# Patient Record
Sex: FEMALE | Race: OTHER | HISPANIC OR LATINO | Employment: UNEMPLOYED | URBAN - METROPOLITAN AREA
[De-identification: names, ages, dates, MRNs, and addresses within clinical notes are randomized per-mention and may not be internally consistent; named-entity substitution may affect disease eponyms.]

---

## 2023-07-10 ENCOUNTER — HOSPITAL ENCOUNTER (EMERGENCY)
Facility: HOSPITAL | Age: 23
Discharge: HOME | End: 2023-07-10
Attending: EMERGENCY MEDICINE

## 2023-07-10 VITALS
BODY MASS INDEX: 36.09 KG/M2 | RESPIRATION RATE: 16 BRPM | DIASTOLIC BLOOD PRESSURE: 72 MMHG | TEMPERATURE: 97.8 F | SYSTOLIC BLOOD PRESSURE: 133 MMHG | HEART RATE: 72 BPM | OXYGEN SATURATION: 95 % | WEIGHT: 223.6 LBS

## 2023-07-10 DIAGNOSIS — N39.0 UTI (URINARY TRACT INFECTION): Primary | ICD-10-CM

## 2023-07-10 LAB
AMORPH URATE CRY URNS QL MICRO: ABNORMAL /HPF
BACTERIA UR QL AUTO: ABNORMAL /HPF
BILIRUB UR QL STRIP: ABNORMAL
CLARITY UR: ABNORMAL
COLOR UR: ABNORMAL
EXT PREGNANCY TEST URINE: NEGATIVE
EXT. CONTROL: NORMAL
GLUCOSE UR STRIP-MCNC: NEGATIVE MG/DL
HGB UR QL STRIP.AUTO: 50
KETONES UR STRIP-MCNC: NEGATIVE MG/DL
LEUKOCYTE ESTERASE UR QL STRIP: NEGATIVE
NITRITE UR QL STRIP: POSITIVE
NON-SQ EPI CELLS URNS QL MICRO: ABNORMAL /HPF
PH UR STRIP.AUTO: 5 [PH]
PROT UR STRIP-MCNC: ABNORMAL MG/DL
RBC #/AREA URNS AUTO: ABNORMAL /HPF
SP GR UR STRIP.AUTO: 1.02 (ref 1–1.04)
URINE COMMENT: ABNORMAL
UROBILINOGEN UA: 8 MG/DL
WBC #/AREA URNS AUTO: ABNORMAL /HPF

## 2023-07-10 PROCEDURE — 81025 URINE PREGNANCY TEST: CPT | Performed by: EMERGENCY MEDICINE

## 2023-07-10 PROCEDURE — 81001 URINALYSIS AUTO W/SCOPE: CPT | Performed by: EMERGENCY MEDICINE

## 2023-07-10 PROCEDURE — 81003 URINALYSIS AUTO W/O SCOPE: CPT | Performed by: EMERGENCY MEDICINE

## 2023-07-10 RX ORDER — NITROFURANTOIN 25; 75 MG/1; MG/1
100 CAPSULE ORAL 2 TIMES DAILY
Qty: 10 CAPSULE | Refills: 0 | Status: SHIPPED | OUTPATIENT
Start: 2023-07-10 | End: 2023-07-15

## 2023-07-10 NOTE — ED PROVIDER NOTES
History  Chief Complaint   Patient presents with   • Possible UTI     Pt c/o burning with urination. 76-year-old female, presents with burning with urination. Symptoms present for 2 days, had similar symptoms several months ago with urinary tract infection. Denies any associated fevers or back pain. History provided by:  Patient   used: Yes        Prior to Admission Medications   Prescriptions Last Dose Informant Patient Reported? Taking?   doxycycline hyclate (VIBRAMYCIN) 100 mg capsule  Self Yes No   Sig: Take 100 mg by mouth every 12 (twelve) hours   triamcinolone (KENALOG) 0.1 % cream   No No   Sig: Apply topically 2 (two) times a day      Facility-Administered Medications: None       History reviewed. No pertinent past medical history. Past Surgical History:   Procedure Laterality Date   • KNEE LIGAMENT RECONSTRUCTION Right 09/21/2020    Knee cap dislocation ([place])       Family History   Problem Relation Age of Onset   • No Known Problems Mother    • No Known Problems Father    • Breast cancer Sister 35        half sister-same dad   • No Known Problems Sister    • No Known Problems Brother    • No Known Problems Brother    • No Known Problems Brother    • No Known Problems Brother    • No Known Problems Maternal Grandmother    • No Known Problems Maternal Grandfather    • No Known Problems Paternal Grandmother    • Diabetes Paternal Grandfather      I have reviewed and agree with the history as documented. E-Cigarette/Vaping   • E-Cigarette Use Never User      E-Cigarette/Vaping Substances   • Nicotine No    • THC No    • CBD No    • Flavoring No      Social History     Tobacco Use   • Smoking status: Never   • Smokeless tobacco: Never   Vaping Use   • Vaping Use: Never used   Substance Use Topics   • Alcohol use: Never   • Drug use: Never       Review of Systems   Constitutional: Negative. Negative for fever. Genitourinary: Positive for dysuria.    Musculoskeletal: Negative. Negative for back pain. Physical Exam  Physical Exam  Vitals and nursing note reviewed. Constitutional:       General: She is not in acute distress. HENT:      Head: Normocephalic and atraumatic. Cardiovascular:      Rate and Rhythm: Normal rate. Pulmonary:      Effort: Pulmonary effort is normal.   Abdominal:      General: There is no distension. Palpations: Abdomen is soft. Tenderness: There is no abdominal tenderness. There is no right CVA tenderness or left CVA tenderness. Skin:     General: Skin is warm and dry. Neurological:      General: No focal deficit present. Mental Status: She is alert and oriented to person, place, and time. Motor: No weakness. Gait: Gait normal.         Vital Signs  ED Triage Vitals [07/10/23 0951]   Temperature Pulse Respirations Blood Pressure SpO2   97.8 °F (36.6 °C) 72 16 133/72 95 %      Temp Source Heart Rate Source Patient Position - Orthostatic VS BP Location FiO2 (%)   Tympanic Monitor Lying Left arm --      Pain Score       5           Vitals:    07/10/23 0951   BP: 133/72   Pulse: 72   Patient Position - Orthostatic VS: Lying         Visual Acuity      ED Medications  Medications - No data to display    Diagnostic Studies  Results Reviewed     Procedure Component Value Units Date/Time    Urine Microscopic [[id number]]  (Abnormal) Collected: 07/10/23 0957    Lab Status: Final result Specimen: Urine, Clean Catch Updated: 07/10/23 1039     RBC, UA 4-10 /hpf      WBC, UA 4-10 /hpf      Epithelial Cells Occasional /hpf      Bacteria, UA       Field obscured, unable to enumerate     /hpf     AMORPH URATES Innumerable /hpf      URINE COMMENT Urine is very icteric, possibly due to dye.     UA w Reflex to Microscopic w Reflex to Culture [[id number]]  (Abnormal) Collected: 07/10/23 0957    Lab Status: Final result Specimen: Urine, Clean Catch Updated: 07/10/23 1038     Color, UA Red     Clarity, UA Turbid     Specific Gravity, UA 1.025 pH, UA 5.0     Leukocytes, UA Negative     Nitrite, UA Positive     Protein,  (2+) mg/dl      Glucose, UA Negative mg/dl      Ketones, UA Negative mg/dl      Bilirubin, UA 6 mg/dL     Occult Blood, UA 50.0     UROBILINOGEN UA 8.0 mg/dL     POCT pregnancy, urine [[id number]]  (Normal) Resulted: 07/10/23 0958    Lab Status: Final result Updated: 07/10/23 0959     EXT Preg Test, Ur Negative     Control Valid                 No orders to display              Procedures  Procedures         ED Course                               SBIRT 22yo+    Flowsheet Row Most Recent Value   Initial Alcohol Screen: US AUDIT-C     1. How often do you have a drink containing alcohol? 0 Filed at: 07/10/2023 0958   2. How many drinks containing alcohol do you have on a typical day you are drinking? 0 Filed at: 07/10/2023 0958   3a. Male UNDER 65: How often do you have five or more drinks on one occasion? 0 Filed at: 07/10/2023 0958   3b. FEMALE Any Age, or MALE 65+: How often do you have 4 or more drinks on one occassion? 0 Filed at: 07/10/2023 0958   Audit-C Score 0 Filed at: 07/10/2023 3430   MARCO ANTONIO: How many times in the past year have you. .. Used an illegal drug or used a prescription medication for non-medical reasons? Never Filed at: 07/10/2023 1364                    Medical Decision Making  23-year-old female, presents with dysuria. Differential diagnosis include urinary tract infection, pyelonephritis among other diagnoses. Patient looks well and in no distress, afebrile, benign abdominal exam.  Urinalysis ordered, results reviewed and discussed with patient, consistent with urinary tract infection. Will treat with oral antibiotics, Macrobid prescribed to pharmacy. Patient reports frequent urinary tract infections, discussed with her follow-up with gynecology for further evaluation. Instructed to follow-up or return emergency department for any worsening or new concerning symptoms.     I have reviewed test results and diagnosis with patient. Follow-up plan reviewed. Precautions for acute return for re-evaluation are reviewed. Opportunity to ask questions was provided. Patient verbalizes understanding. Amount and/or Complexity of Data Reviewed  External Data Reviewed: notes. Details: Seen 11/15 at another emergency department, was given Keflex for urinary tract infection. Labs: ordered. Decision-making details documented in ED Course. Risk  Prescription drug management. Disposition  Final diagnoses:   UTI (urinary tract infection)     Time reflects when diagnosis was documented in both MDM as applicable and the Disposition within this note     Time User Action Codes Description Comment    7/10/2023 10:48 AM Ryley Ortiz Add [N39.0] UTI (urinary tract infection)       ED Disposition     ED Disposition   Discharge    Condition   Stable    Date/Time   Mon Jul 10, 2023 10:48 AM    Comment   Oscar Booker discharge to home/self care.                Follow-up Information     Follow up With Specialties Details Why Contact Info Additional [address] [facility] Schedule an appointment as soon as possible for a visit   [address] [address]  [address] [place] [address], [address] [place] [address], [address] [phone]          Discharge Medication List as of 7/10/2023 10:49 AM      START taking these medications    Details   nitrofurantoin (MACROBID) 100 mg capsule Take 1 capsule (100 mg total) by mouth 2 (two) times a day for 5 days, Starting Mon 7/10/2023, Until Sat 7/15/2023, Normal         CONTINUE these medications which have NOT CHANGED    Details   doxycycline hyclate (VIBRAMYCIN) 100 mg capsule Take 100 mg by mouth every 12 (twelve) hours, Historical Med      triamcinolone (KENALOG) 0.1 % cream Apply topically 2 (two) times a day, Starting Mon 5/8/2023, Normal             No discharge procedures on file.     PDMP Review     None          ED Provider  Electronically Signed by           Maxi Gonzalez MD  07/10/23 4920

## 2024-01-12 ENCOUNTER — TELEPHONE (OUTPATIENT)
Dept: PSYCHIATRY | Facility: CLINIC | Age: 24
End: 2024-01-12

## 2024-01-12 NOTE — TELEPHONE ENCOUNTER
From: [email or web address] <[email or web address]>   Sent: Tuesday, January 2, 2024 7:33 AM  To: [employer] <[email or web address]  Subject: [EXTERNAL] [facility] - Contact Us    WARNING! Based upon the critical issue with [employer] targeting Healthcare systems ALL attachments and links from this email should be heavily scrutinized.  First Name: Nova  Last Name: Lisa  Email: [email or web address]  Phone: [phone]   Leave Message: Yes, it's ok to leave a message.   Do you have a family doctor? No   Zip: [id number]   What is your reason for contacting us? Me katherine obtmarina ayuda psicológica, en español por favor

## 2024-01-12 NOTE — TELEPHONE ENCOUNTER
Called pt to follow up on message sent, verify needs of services and inform pt of current wait list for services. No answer, lvm for pt to call back the intake dept at [phone].

## 2024-03-11 ENCOUNTER — TELEPHONE (OUTPATIENT)
Dept: OBGYN CLINIC | Facility: CLINIC | Age: 24
End: 2024-03-11

## 2024-03-11 NOTE — TELEPHONE ENCOUNTER
Lvm for patient to advise she would need to be seen in office before medication is sent. Last seen 5/2023, given office call back number to contact.

## 2024-03-18 ENCOUNTER — TELEPHONE (OUTPATIENT)
Age: 24
End: 2024-03-18

## 2024-03-18 ENCOUNTER — TELEPHONE (OUTPATIENT)
Dept: BARIATRICS | Facility: CLINIC | Age: 24
End: 2024-03-18

## 2024-03-18 NOTE — TELEPHONE ENCOUNTER
Patient called in to request surgical consult with Dr Lynn, I told her her BMI was too low but she said she now weights 253 lbs, which would put her right at a 40 BMI.  Tried to tx to clinic but no one was available, can someone please call her to schedule, she is Guamanian speaking so she will need an .  Her call back number is [phone].  Thanks.

## 2024-03-18 NOTE — TELEPHONE ENCOUNTER
XOCHITL with the help of Urdu interpretor [phone] to schedule an sx evaluation. Left office info to call back [phone]

## 2024-03-20 ENCOUNTER — OFFICE VISIT (OUTPATIENT)
Dept: OBGYN CLINIC | Facility: CLINIC | Age: 24
End: 2024-03-20

## 2024-03-20 VITALS
DIASTOLIC BLOOD PRESSURE: 74 MMHG | BODY MASS INDEX: 39.76 KG/M2 | SYSTOLIC BLOOD PRESSURE: 119 MMHG | HEART RATE: 59 BPM | HEIGHT: 66 IN | WEIGHT: 247.4 LBS

## 2024-03-20 DIAGNOSIS — B96.89 BACTERIAL VAGINOSIS: ICD-10-CM

## 2024-03-20 DIAGNOSIS — B37.9 YEAST INFECTION: Primary | ICD-10-CM

## 2024-03-20 DIAGNOSIS — N76.0 BACTERIAL VAGINOSIS: ICD-10-CM

## 2024-03-20 LAB
BV WHIFF TEST VAG QL: POSITIVE
CLUE CELLS SPEC QL WET PREP: POSITIVE
PH SMN: 5.5 [PH]
SL AMB POCT WET MOUNT: ABNORMAL
T VAGINALIS VAG QL WET PREP: NEGATIVE
YEAST VAG QL WET PREP: POSITIVE

## 2024-03-20 PROCEDURE — 99213 OFFICE O/P EST LOW 20 MIN: CPT | Performed by: NURSE PRACTITIONER

## 2024-03-20 PROCEDURE — 87210 SMEAR WET MOUNT SALINE/INK: CPT | Performed by: NURSE PRACTITIONER

## 2024-03-20 RX ORDER — FLUCONAZOLE 150 MG/1
150 TABLET ORAL ONCE
Qty: 1 TABLET | Refills: 1 | Status: SHIPPED | OUTPATIENT
Start: 2024-03-20 | End: 2024-03-20

## 2024-03-20 RX ORDER — METRONIDAZOLE 500 MG/1
500 TABLET ORAL EVERY 12 HOURS SCHEDULED
Qty: 14 TABLET | Refills: 0 | Status: SHIPPED | OUTPATIENT
Start: 2024-03-20 | End: 2024-03-27

## 2024-03-20 NOTE — PROGRESS NOTES
"Assessment/Plan:     Diagnoses and all orders for this visit:    Yeast infection  -     POCT wet mount  -     fluconazole (DIFLUCAN) 150 mg tablet; Take 1 tablet (150 mg total) by mouth once for 1 dose    Bacterial vaginosis  -     POCT wet mount  -     metroNIDAZOLE (FLAGYL) 500 mg tablet; Take 1 tablet (500 mg total) by mouth every 12 (twelve) hours for 7 days      Plan  ruth Metronidazole and Fluconazole as directed  Wear loose clothes and cotton underwear  Call with needs or concerns  Return in 2 weeks for annual GYN exam    Subjective:      Patient ID: Nova Summers is a 23 y.o. female.    HPI  Pt presents with concerns she has vaginal  irritation and  vulvar itching and vaginal discharge with an odor for several days  Last WNL PAP was 3/20/2023    Explained wet mount was positive for yeast and BV, safe and effective use of Fluconazole and Metronidazole was provided, discussed comfort measures        Depression Screening Follow-up Plan: Patient's depression screening was negative with a PHQ-2 score of 0. Their PHQ-9 score was 0. Clinically patient does not have depression. No treatment is required.      The following portions of the patient's history were reviewed and updated as appropriate: allergies, current medications, past family history, past medical history, past social history, past surgical history and problem list.    Review of Systems    .Pertinent items are note in the HPI      Objective:      /74 (BP Location: Left arm, Patient Position: Sitting, Cuff Size: Standard)   Pulse 59   Ht 5' 6\" (1.676 m)   Wt 112 kg (247 lb 6.4 oz)   LMP 02/22/2024 (Exact Date)   BMI 39.93 kg/m²          Physical Exam    Alert and oriented  WNL respiratory effort, negative cough or SOB  Vulva negative lesions positive erythema erythema  Vagina positive thin gray/white discharge, thick white discharge was noted on the walls of the vagina  Cervix negative lseions, positive thin gray discharge  Wet " mount was positive for BV and yeast

## 2024-03-20 NOTE — PATIENT INSTRUCTIONS
Take Metronidazole and Fluconazole as directed  Wear loose clothes and cotton underwear  Call with needs or concerns  Return in 2 weeks for annual GYN exam

## 2024-05-29 ENCOUNTER — OFFICE VISIT (OUTPATIENT)
Dept: URGENT CARE | Age: 24
End: 2024-05-29
Payer: COMMERCIAL

## 2024-05-29 VITALS
WEIGHT: 247 LBS | BODY MASS INDEX: 39.87 KG/M2 | HEART RATE: 99 BPM | SYSTOLIC BLOOD PRESSURE: 116 MMHG | RESPIRATION RATE: 18 BRPM | DIASTOLIC BLOOD PRESSURE: 80 MMHG | OXYGEN SATURATION: 98 %

## 2024-05-29 DIAGNOSIS — Z02.4 DRIVER'S PERMIT PE (PHYSICAL EXAMINATION): Primary | ICD-10-CM

## 2024-05-29 NOTE — PROGRESS NOTES
[facility]        NAME: Nova Summers is a 23 y.o. female  : 2000    MRN: [id number]  DATE: May 29, 2024  TIME: 6:37 PM    Assessment and Plan   's permit PE (physical examination) [Z02.4]  1. 's permit PE (physical examination)              Patient Instructions       Follow up with PCP in 3-5 days.  Proceed to  ER if symptoms worsen.    If tests have been performed at [facility], our office will contact you with results if changes need to be made to the care plan discussed with you at the visit.  You can review your full results on [facility].    Chief Complaint     Chief Complaint   Patient presents with    Annual Exam     Pt presents for learners permit physical.           History of Present Illness       23-year-old female with no past medical history, presents for [facility] permit physical.  Denies use of medications, hospitalizations.  Denies all pertinent ROS.        Review of Systems   Review of Systems   Eyes:  Negative for visual disturbance.   Respiratory:  Negative for shortness of breath and wheezing.    Cardiovascular:  Negative for chest pain and palpitations.   Neurological:  Negative for dizziness, seizures, syncope and light-headedness.         Current Medications       Current Outpatient Medications:     doxycycline hyclate (VIBRAMYCIN) 100 mg capsule, Take 100 mg by mouth every 12 (twelve) hours (Patient not taking: Reported on 3/20/2024), Disp: , Rfl:     triamcinolone (KENALOG) 0.1 % cream, Apply topically 2 (two) times a day (Patient not taking: Reported on 3/20/2024), Disp: 30 g, Rfl: 0    Current Allergies     Allergies as of 2024    (No Known Allergies)            The following portions of the patient's history were reviewed and updated as appropriate: allergies, current medications, past family history, past medical history, past social history, past surgical history and problem list.     History reviewed. No pertinent past medical  history.    Past Surgical History:   Procedure Laterality Date    KNEE LIGAMENT RECONSTRUCTION Right 09/21/2020    Knee cap dislocation ([facility])       Family History   Problem Relation Age of Onset    No Known Problems Mother     No Known Problems Father     Breast cancer Sister 33        half sister-same dad    No Known Problems Sister     No Known Problems Brother     No Known Problems Brother     No Known Problems Brother     No Known Problems Brother     No Known Problems Maternal Grandmother     No Known Problems Maternal Grandfather     No Known Problems Paternal Grandmother     Diabetes Paternal Grandfather          Medications have been verified.        Objective   /80 (BP Location: Right arm, Patient Position: Sitting, Cuff Size: Standard)   Pulse 99   Resp 18   Wt 112 kg (247 lb)   LMP  (Approximate)   SpO2 98%   BMI 39.87 kg/m²   No LMP recorded (approximate).       Physical Exam     Physical Exam  Vitals and nursing note reviewed.   Constitutional:       General: She is not in acute distress.     Appearance: She is not toxic-appearing.   HENT:      Head: Normocephalic and atraumatic.      Right Ear: Tympanic membrane, ear canal and external ear normal.      Left Ear: Tympanic membrane, ear canal and external ear normal.      Nose: Nose normal.      Mouth/Throat:      Mouth: Mucous membranes are moist.   Eyes:      Extraocular Movements: Extraocular movements intact.      Conjunctiva/sclera: Conjunctivae normal.      Pupils: Pupils are equal, round, and reactive to light.   Cardiovascular:      Rate and Rhythm: Normal rate and regular rhythm.      Pulses: Normal pulses.      Heart sounds: Normal heart sounds.   Pulmonary:      Effort: Pulmonary effort is normal.      Breath sounds: Normal breath sounds.   Abdominal:      General: There is no distension.      Palpations: Abdomen is soft.   Musculoskeletal:         General: Normal range of motion.      Cervical back: Normal range of motion.       Right lower leg: No edema.      Left lower leg: No edema.   Lymphadenopathy:      Cervical: No cervical adenopathy.   Neurological:      Mental Status: She is alert.      Gait: Gait normal.   Psychiatric:         Mood and Affect: Mood normal.         Behavior: Behavior normal.

## 2024-06-14 ENCOUNTER — OFFICE VISIT (OUTPATIENT)
Dept: BARIATRICS | Facility: CLINIC | Age: 24
End: 2024-06-14

## 2024-06-14 VITALS
WEIGHT: 265 LBS | BODY MASS INDEX: 42.59 KG/M2 | HEART RATE: 97 BPM | SYSTOLIC BLOOD PRESSURE: 128 MMHG | DIASTOLIC BLOOD PRESSURE: 78 MMHG | HEIGHT: 66 IN | TEMPERATURE: 99.3 F

## 2024-06-14 DIAGNOSIS — E66.01 OBESITY, CLASS III, BMI 40-49.9 (MORBID OBESITY) (HCC): Primary | ICD-10-CM

## 2024-06-14 PROBLEM — E66.813 OBESITY, CLASS III, BMI 40-49.9 (MORBID OBESITY): Status: ACTIVE | Noted: 2024-06-14

## 2024-06-14 PROCEDURE — 99204 OFFICE O/P NEW MOD 45 MIN: CPT | Performed by: SURGERY

## 2024-06-14 NOTE — PROGRESS NOTES
"    BARIATRIC INITIAL CONSULT - BARIATRIC SURGERY    Nova Summers 23 y.o. female MRN: [id number]  Unit/Bed#:  Encounter: [id number]      HPI:  Nova Summers is a 23 y.o. female who presents with a longstanding history of morbid obesity and inability to sustain a meaningful weight loss.    Here today to discuss bariatric options.    Visit type: initial visit    Symptoms: excess weight and inability to loss weight    Associated Symptoms: depressed mood and anxiety    Associated Conditions: abdominal obesity  Disease Complications: none  Weight Loss Interest: high  Previous Diet Trials: low calorie     Exercise Frequency:infrequency  Types of Exercise: walking        Review of Systems   All other systems reviewed and are negative.      Historical Information   History reviewed. No pertinent past medical history.  Past Surgical History:   Procedure Laterality Date    KNEE LIGAMENT RECONSTRUCTION Right 09/21/2020    Knee cap dislocation ([facility])     Social History   Social History     Substance and Sexual Activity   Alcohol Use Never     Social History     Substance and Sexual Activity   Drug Use Never     Social History     Tobacco Use   Smoking Status Never   Smokeless Tobacco Never     Family History: non-contributory    Meds/Allergies   all medications and allergies reviewed  No Known Allergies    Objective       Current Vitals:   Blood Pressure: 128/78 (06/14/24 1147)  Pulse: 97 (06/14/24 1147)  Temperature: 99.3 °F (37.4 °C) (06/14/24 1147)  Temp Source: Tympanic (06/14/24 1147)  Height: 5' 5.5\" (166.4 cm) (06/14/24 1147)  Weight - Scale: 120 kg (265 lb) (06/14/24 1147)        Invasive Devices       None                   Physical Exam  Vitals reviewed.   Constitutional:       General: She is not in acute distress.     Appearance: She is well-developed. She is not diaphoretic.   HENT:      Head: Normocephalic and atraumatic.      Right Ear: External ear normal.      Left Ear: External ear " normal.      Nose: Nose normal.   Eyes:      General: No scleral icterus.        Right eye: No discharge.         Left eye: No discharge.      Conjunctiva/sclera: Conjunctivae normal.   Neurological:      Mental Status: She is alert and oriented to person, place, and time.   Psychiatric:         Mood and Affect: Mood and affect normal.         Behavior: Behavior normal.         Thought Content: Thought content normal.         Judgment: Judgment normal.         Lab Results: I have personally reviewed pertinent lab results.    Imaging: I have personally reviewed pertinent reports.    EKG, Pathology, and Other Studies: I have personally reviewed pertinent reports.        Assessment/PLAN:    23 y.o. female with a long standing h/o of obesity and inability to sustain any meaningful weight loss on her own despite several attempts.    She is interested in the Laparoscopic sleeve gastrectomy possible gastric bypass.  I have discussed both options with her today.    As a part of her pre op evaluation, she will be referred to a cardiologist for risk stratification and for a sleep evaluation and consult to rule out obstructive sleep apnea if deemed necessary based on her score.    She needs an EGD to evaluate the anatomy of her GI tract prior to the operation.  I have spent over 30 minutes with her face to face in the office today discussing her options and details of the surgery. We have seen an animation of the surgery on the computer that illustrates how the operation is done and how the anatomy will be altered with the procedure. Over 50% of this was coordinating care.    I have used the MBSAQIP bariatric surgical risk/benefit calculator and we have discussed the results as part of the preop education.  She was given the opportunity to ask questions and I have answered all of them.  I have discussed and educated the patient with regards to the components of our multidisciplinary program and the importance of compliance and  follow up in the post operative period. The patient was also instructed with regards to the importance of behavior modification, nutritional counseling, support meeting attendance and lifestyle changes that are important to ensure success.     Although there is a great statistical chance of improvement or even resolution of most of her associated comorbidities, the results vary from patient to patient and they largely depend on her commitment and compliance.     I have reviewed instructions for stopping or tapering anti-obesity medications prior to surgery.      I have encouraged her to lose 5-10 lbs prior to the operation.      Carlos Lynn MD  6/14/2024  11:54 AM

## 2024-06-14 NOTE — PROGRESS NOTES
- Height/Weight:  65.5 / 265 lb  - BMI:  43.4  - Medical conditions related to obesity: no  - Does the patient smoke/vape (nicotine, marijuana, hookah, etc): no  - StopBAN/8    - Does the patient currently take a weight loss medication: no

## 2024-12-27 ENCOUNTER — TELEPHONE (OUTPATIENT)
Dept: BARIATRICS | Facility: CLINIC | Age: 24
End: 2024-12-27

## 2024-12-27 NOTE — TELEPHONE ENCOUNTER
NEW EVAL Do not schedule with insurance until she speaks with finance as self pay or get insurance with a card  BMI 41  NO INSURANCE SELF PAY ($30 now and billed 50% disc)  GAVE [employer] INFO FOR INSURANCE  NEXT VISIT WITH

## 2025-03-24 ENCOUNTER — OFFICE VISIT (OUTPATIENT)
Age: 25
End: 2025-03-24

## 2025-03-24 DIAGNOSIS — H02.536 LID RETRACTION OF LEFT EYE: Primary | ICD-10-CM

## 2025-03-24 PROCEDURE — 92004 COMPRE OPH EXAM NEW PT 1/>: CPT | Performed by: OPHTHALMOLOGY

## 2025-03-24 NOTE — PROGRESS NOTES
Name: Nova Summers      : 2000      MRN: [id number]  Encounter Provider: Mona Sanders MD  Encounter Date: 3/24/2025   Encounter department: [facility]  :  Assessment & Plan  Lid retraction of left eye  Pt may have lid retraction in the left vs. Ptosis in the right.  Recommend eval with oculoplastics. But prior to that consider thyroid evaluation. Pt does have dry eye OS; Recommend AT OU QID;  Pt has been tired; Recommend evaluation with PCP first;          Nova Summers is a 24 y.o. female who presents for evaluation of discomfort and pain, OS.     This worsened today after using computer for an extended period. She now feels VA OS is blurry and light sensitive.  She reports having symptoms for some time but they are significantly worse today.    Also feels like OS is enlarged compared to OD.    Tried artificial tears today with no relief.    Reports last eye exam 3 years ago, she has a history of dry eyes.    History obtained from: patient    Review of Systems  Medical History Reviewed by provider this encounter:  Tobacco  Allergies  Meds  Problems  Med Hx  Surg Hx  Fam Hx     .     Past Ocular History: CORNEL  Ocular Meds/Drops: artificial tears used today    Base Eye Exam       Visual Acuity (Snellen - Linear)         Right Left    Dist sc 20/20 20/20              Tonometry (Tonopen, 1:33 PM)         Right Left    Pressure 12 13              Pupils         Pupils    Right PERRL    Left PERRL              Visual Fields         Left Right     Full Full              Extraocular Movement         Right Left     Full Full              Neuro/Psych       Oriented x3: Yes              Dilation       Both eyes: 2.5% Phenylephrine, 1% Tropicamide @ 1:33 PM                  Slit Lamp and Fundus Exam       External Exam         Right Left    External Normal Normal              Slit Lamp Exam         Right Left    Lids/Lashes Normal Normal    Conjunctiva/Sclera White and  quiet White and quiet    Cornea Clear trace PEK    Anterior Chamber Deep and quiet Deep and quiet    Iris Round and reactive Round and reactive    Lens Clear Clear    Anterior Vitreous No PVD, clear, no cell No PVD, clear, no cell              Fundus Exam         Right Left    Disc sharp, no pallor sharp, no pallor    C/D Ratio 0.25 0.25    Macula flat/no heme / good foveal reflex flat/no heme / good foveal reflex    Vessels normal in caliber normal in caliber    Periphery flat, no retinal tear or detachment flat, no retinal tear or detachment                      IMAGING: N/A;

## 2025-06-04 ENCOUNTER — OFFICE VISIT (OUTPATIENT)
Dept: FAMILY MEDICINE CLINIC | Facility: CLINIC | Age: 25
End: 2025-06-04

## 2025-06-04 VITALS
HEART RATE: 83 BPM | BODY MASS INDEX: 39.86 KG/M2 | WEIGHT: 248 LBS | RESPIRATION RATE: 19 BRPM | SYSTOLIC BLOOD PRESSURE: 112 MMHG | DIASTOLIC BLOOD PRESSURE: 74 MMHG | TEMPERATURE: 97.6 F | OXYGEN SATURATION: 97 % | HEIGHT: 66 IN

## 2025-06-04 DIAGNOSIS — T78.40XA ALLERGY, INITIAL ENCOUNTER: Primary | ICD-10-CM

## 2025-06-04 PROCEDURE — 99203 OFFICE O/P NEW LOW 30 MIN: CPT

## 2025-06-04 RX ORDER — FLUTICASONE PROPIONATE 50 MCG
2 SPRAY, SUSPENSION (ML) NASAL DAILY
Qty: 16 G | Refills: 1 | Status: SHIPPED | OUTPATIENT
Start: 2025-06-04

## 2025-06-04 RX ORDER — FEXOFENADINE HCL 180 MG/1
180 TABLET ORAL DAILY
Qty: 30 TABLET | Refills: 1 | Status: SHIPPED | OUTPATIENT
Start: 2025-06-04

## 2025-06-04 RX ORDER — ALBUTEROL SULFATE 90 UG/1
2 INHALANT RESPIRATORY (INHALATION) EVERY 6 HOURS PRN
Qty: 6.7 G | Refills: 5 | Status: SHIPPED | OUTPATIENT
Start: 2025-06-04

## 2025-06-04 NOTE — PROGRESS NOTES
"Name: Nova Summers      : 2000      MRN: [id number]  Encounter Provider: PAUL Dodge  Encounter Date: 2025   Encounter department: [facility]  :  Assessment & Plan  Allergy, initial encounter  - Congestion for over a month, reports history of  seasonal allergy, trial OTC with out improvement. Denies smoking/second hand smoking   Orders:    fexofenadine (ALLEGRA) 180 MG tablet; Take 1 tablet (180 mg total) by mouth daily    fluticasone (FLONASE) 50 mcg/act nasal spray; 2 sprays into each nostril daily    albuterol (Proventil HFA) 90 mcg/act inhaler; Inhale 2 puffs every 6 (six) hours as needed for wheezing           History of Present Illness   Patient is a 24 y.o. female whom  has no past medical history on file. who is seen today in office for care establishment. Concern includes seasonal allergy for over a month.         Review of Systems   Constitutional: Negative.    HENT:  Positive for congestion and postnasal drip.    Eyes: Negative.    Respiratory: Negative.     Cardiovascular: Negative.    Genitourinary: Negative.    Skin: Negative.    Neurological: Negative.        Objective   /74 (BP Location: Right arm, Patient Position: Sitting, Cuff Size: Large)   Pulse 83   Temp 97.6 °F (36.4 °C) (Temporal)   Resp 19   Ht 5' 5.5\" (1.664 m)   Wt 112 kg (248 lb)   LMP 2025 (Exact Date)   SpO2 97%   BMI 40.64 kg/m²      Physical Exam  Constitutional:       General: She is not in acute distress.     Appearance: Normal appearance. She is not ill-appearing.   HENT:      Head: Normocephalic and atraumatic.      Right Ear: Tympanic membrane normal.      Left Ear: Tympanic membrane normal.      Nose: Congestion present.      Mouth/Throat:      Mouth: Mucous membranes are moist.     Eyes:      Extraocular Movements: Extraocular movements intact.      Conjunctiva/sclera: Conjunctivae normal.      Pupils: Pupils are equal, round, and reactive to " light.       Cardiovascular:      Rate and Rhythm: Normal rate.      Pulses: Normal pulses.      Heart sounds: Normal heart sounds.   Pulmonary:      Effort: Pulmonary effort is normal. No respiratory distress.      Breath sounds: Normal breath sounds.   Abdominal:      General: There is no distension.      Palpations: Abdomen is soft.     Musculoskeletal:         General: Normal range of motion.      Cervical back: Normal range of motion.     Skin:     General: Skin is warm.     Neurological:      General: No focal deficit present.      Mental Status: She is alert and oriented to person, place, and time. Mental status is at baseline.     Psychiatric:         Mood and Affect: Mood normal.         Behavior: Behavior normal.         Thought Content: Thought content normal.         Judgment: Judgment normal.

## 2025-06-04 NOTE — ASSESSMENT & PLAN NOTE
- Congestion for over a month, reports history of  seasonal allergy, trial OTC with out improvement. Denies smoking/second hand smoking   Orders:    fexofenadine (ALLEGRA) 180 MG tablet; Take 1 tablet (180 mg total) by mouth daily    fluticasone (FLONASE) 50 mcg/act nasal spray; 2 sprays into each nostril daily    albuterol (Proventil HFA) 90 mcg/act inhaler; Inhale 2 puffs every 6 (six) hours as needed for wheezing

## 2025-06-14 ENCOUNTER — APPOINTMENT (OUTPATIENT)
Dept: LAB | Facility: MEDICAL CENTER | Age: 25
End: 2025-06-14

## 2025-06-14 ENCOUNTER — APPOINTMENT (OUTPATIENT)
Dept: LAB | Facility: MEDICAL CENTER | Age: 25
End: 2025-06-14
Attending: SURGERY

## 2025-06-14 DIAGNOSIS — Z01.812 PRE-OPERATIVE LABORATORY EXAMINATION: ICD-10-CM

## 2025-06-14 LAB
ANION GAP SERPL CALCULATED.3IONS-SCNC: 8 MMOL/L (ref 4–13)
BUN SERPL-MCNC: 13 MG/DL (ref 5–25)
CALCIUM SERPL-MCNC: 9 MG/DL (ref 8.4–10.2)
CHLORIDE SERPL-SCNC: 107 MMOL/L (ref 96–108)
CO2 SERPL-SCNC: 26 MMOL/L (ref 21–32)
CREAT SERPL-MCNC: 0.61 MG/DL (ref 0.6–1.3)
ERYTHROCYTE [DISTWIDTH] IN BLOOD BY AUTOMATED COUNT: 13.2 % (ref 11.6–15.1)
GFR SERPL CREATININE-BSD FRML MDRD: 126 ML/MIN/1.73SQ M
GLUCOSE P FAST SERPL-MCNC: 83 MG/DL (ref 65–99)
HCT VFR BLD AUTO: 42.4 % (ref 34.8–46.1)
HGB BLD-MCNC: 13.2 G/DL (ref 11.5–15.4)
INR PPP: 1.01 (ref 0.85–1.19)
MCH RBC QN AUTO: 30.8 PG (ref 26.8–34.3)
MCHC RBC AUTO-ENTMCNC: 31.1 G/DL (ref 31.4–37.4)
MCV RBC AUTO: 99 FL (ref 82–98)
PLATELET # BLD AUTO: 352 THOUSANDS/UL (ref 149–390)
PMV BLD AUTO: 10.2 FL (ref 8.9–12.7)
POTASSIUM SERPL-SCNC: 4.3 MMOL/L (ref 3.5–5.3)
PROTHROMBIN TIME: 13.6 SECONDS (ref 12.3–15)
RBC # BLD AUTO: 4.29 MILLION/UL (ref 3.81–5.12)
SODIUM SERPL-SCNC: 141 MMOL/L (ref 135–147)
WBC # BLD AUTO: 9.32 THOUSAND/UL (ref 4.31–10.16)

## 2025-06-14 PROCEDURE — 85610 PROTHROMBIN TIME: CPT

## 2025-06-14 PROCEDURE — 36415 COLL VENOUS BLD VENIPUNCTURE: CPT

## 2025-06-14 PROCEDURE — 80048 BASIC METABOLIC PNL TOTAL CA: CPT

## 2025-06-14 PROCEDURE — 85027 COMPLETE CBC AUTOMATED: CPT

## 2025-06-15 LAB
ATRIAL RATE: 63 BPM
P AXIS: 59 DEGREES
PR INTERVAL: 160 MS
QRS AXIS: 77 DEGREES
QRSD INTERVAL: 82 MS
QT INTERVAL: 408 MS
QTC INTERVAL: 417 MS
T WAVE AXIS: 32 DEGREES
VENTRICULAR RATE: 63 BPM

## 2025-06-15 PROCEDURE — 93010 ELECTROCARDIOGRAM REPORT: CPT | Performed by: INTERNAL MEDICINE
